# Patient Record
Sex: FEMALE | Race: BLACK OR AFRICAN AMERICAN | NOT HISPANIC OR LATINO | Employment: UNEMPLOYED | URBAN - METROPOLITAN AREA
[De-identification: names, ages, dates, MRNs, and addresses within clinical notes are randomized per-mention and may not be internally consistent; named-entity substitution may affect disease eponyms.]

---

## 2023-12-21 ENCOUNTER — HOSPITAL ENCOUNTER (EMERGENCY)
Facility: HOSPITAL | Age: 28
Discharge: HOME | End: 2023-12-21
Attending: EMERGENCY MEDICINE
Payer: MEDICAID

## 2023-12-21 ENCOUNTER — APPOINTMENT (OUTPATIENT)
Dept: RADIOLOGY | Facility: HOSPITAL | Age: 28
End: 2023-12-21
Payer: MEDICAID

## 2023-12-21 ENCOUNTER — ANCILLARY PROCEDURE (OUTPATIENT)
Dept: EMERGENCY MEDICINE | Facility: HOSPITAL | Age: 28
End: 2023-12-21
Payer: MEDICAID

## 2023-12-21 VITALS
HEART RATE: 50 BPM | DIASTOLIC BLOOD PRESSURE: 72 MMHG | SYSTOLIC BLOOD PRESSURE: 118 MMHG | WEIGHT: 215 LBS | BODY MASS INDEX: 35.82 KG/M2 | OXYGEN SATURATION: 100 % | RESPIRATION RATE: 16 BRPM | HEIGHT: 65 IN | TEMPERATURE: 98.1 F

## 2023-12-21 DIAGNOSIS — R42 LIGHTHEADEDNESS: Primary | ICD-10-CM

## 2023-12-21 DIAGNOSIS — R00.1 BRADYCARDIA: ICD-10-CM

## 2023-12-21 LAB
ALBUMIN SERPL BCP-MCNC: 3.2 G/DL (ref 3.4–5)
ALP SERPL-CCNC: 37 U/L (ref 33–110)
ALT SERPL W P-5'-P-CCNC: 8 U/L (ref 7–45)
ANION GAP SERPL CALC-SCNC: 10 MMOL/L (ref 10–20)
AST SERPL W P-5'-P-CCNC: 12 U/L (ref 9–39)
ATRIAL RATE: 48 BPM
B-HCG SERPL-ACNC: <3 MIU/ML
BASOPHILS # BLD AUTO: 0.06 X10*3/UL (ref 0–0.1)
BASOPHILS NFR BLD AUTO: 0.7 %
BILIRUB SERPL-MCNC: 0.2 MG/DL (ref 0–1.2)
BUN SERPL-MCNC: 13 MG/DL (ref 6–23)
CALCIUM SERPL-MCNC: 8.8 MG/DL (ref 8.6–10.6)
CHLORIDE SERPL-SCNC: 110 MMOL/L (ref 98–107)
CO2 SERPL-SCNC: 27 MMOL/L (ref 21–32)
CREAT SERPL-MCNC: 0.7 MG/DL (ref 0.5–1.05)
EOSINOPHIL # BLD AUTO: 0.22 X10*3/UL (ref 0–0.7)
EOSINOPHIL NFR BLD AUTO: 2.5 %
ERYTHROCYTE [DISTWIDTH] IN BLOOD BY AUTOMATED COUNT: 13.6 % (ref 11.5–14.5)
GFR SERPL CREATININE-BSD FRML MDRD: >90 ML/MIN/1.73M*2
GLUCOSE BLD MANUAL STRIP-MCNC: 90 MG/DL (ref 74–99)
GLUCOSE SERPL-MCNC: 87 MG/DL (ref 74–99)
HCT VFR BLD AUTO: 41.3 % (ref 36–46)
HGB BLD-MCNC: 13.9 G/DL (ref 12–16)
IMM GRANULOCYTES # BLD AUTO: 0.02 X10*3/UL (ref 0–0.7)
IMM GRANULOCYTES NFR BLD AUTO: 0.2 % (ref 0–0.9)
LYMPHOCYTES # BLD AUTO: 2.91 X10*3/UL (ref 1.2–4.8)
LYMPHOCYTES NFR BLD AUTO: 32.6 %
MAGNESIUM SERPL-MCNC: 1.81 MG/DL (ref 1.6–2.4)
MCH RBC QN AUTO: 30.6 PG (ref 26–34)
MCHC RBC AUTO-ENTMCNC: 33.7 G/DL (ref 32–36)
MCV RBC AUTO: 91 FL (ref 80–100)
MONOCYTES # BLD AUTO: 0.72 X10*3/UL (ref 0.1–1)
MONOCYTES NFR BLD AUTO: 8.1 %
NEUTROPHILS # BLD AUTO: 5 X10*3/UL (ref 1.2–7.7)
NEUTROPHILS NFR BLD AUTO: 55.9 %
NRBC BLD-RTO: 0 /100 WBCS (ref 0–0)
P AXIS: 21 DEGREES
P OFFSET: 185 MS
P ONSET: 134 MS
PHOSPHATE SERPL-MCNC: 4 MG/DL (ref 2.5–4.9)
PLATELET # BLD AUTO: 224 X10*3/UL (ref 150–450)
POTASSIUM SERPL-SCNC: 3.8 MMOL/L (ref 3.5–5.3)
PR INTERVAL: 174 MS
PROT SERPL-MCNC: 5.3 G/DL (ref 6.4–8.2)
Q ONSET: 221 MS
QRS COUNT: 8 BEATS
QRS DURATION: 88 MS
QT INTERVAL: 426 MS
QTC CALCULATION(BAZETT): 380 MS
QTC FREDERICIA: 395 MS
R AXIS: 26 DEGREES
RBC # BLD AUTO: 4.54 X10*6/UL (ref 4–5.2)
SODIUM SERPL-SCNC: 143 MMOL/L (ref 136–145)
T AXIS: 10 DEGREES
T OFFSET: 434 MS
TSH SERPL-ACNC: 1.08 MIU/L (ref 0.44–3.98)
VENTRICULAR RATE: 48 BPM
WBC # BLD AUTO: 8.9 X10*3/UL (ref 4.4–11.3)

## 2023-12-21 PROCEDURE — 2500000004 HC RX 250 GENERAL PHARMACY W/ HCPCS (ALT 636 FOR OP/ED): Mod: SE

## 2023-12-21 PROCEDURE — 70450 CT HEAD/BRAIN W/O DYE: CPT

## 2023-12-21 PROCEDURE — 96361 HYDRATE IV INFUSION ADD-ON: CPT

## 2023-12-21 PROCEDURE — 99285 EMERGENCY DEPT VISIT HI MDM: CPT | Mod: 25

## 2023-12-21 PROCEDURE — 80053 COMPREHEN METABOLIC PANEL: CPT

## 2023-12-21 PROCEDURE — 96374 THER/PROPH/DIAG INJ IV PUSH: CPT

## 2023-12-21 PROCEDURE — 82947 ASSAY GLUCOSE BLOOD QUANT: CPT

## 2023-12-21 PROCEDURE — 99284 EMERGENCY DEPT VISIT MOD MDM: CPT | Mod: 25 | Performed by: EMERGENCY MEDICINE

## 2023-12-21 PROCEDURE — 99285 EMERGENCY DEPT VISIT HI MDM: CPT | Performed by: EMERGENCY MEDICINE

## 2023-12-21 PROCEDURE — 84100 ASSAY OF PHOSPHORUS: CPT

## 2023-12-21 PROCEDURE — 84443 ASSAY THYROID STIM HORMONE: CPT

## 2023-12-21 PROCEDURE — 36415 COLL VENOUS BLD VENIPUNCTURE: CPT

## 2023-12-21 PROCEDURE — 83735 ASSAY OF MAGNESIUM: CPT

## 2023-12-21 PROCEDURE — 70450 CT HEAD/BRAIN W/O DYE: CPT | Performed by: RADIOLOGY

## 2023-12-21 PROCEDURE — 93005 ELECTROCARDIOGRAM TRACING: CPT

## 2023-12-21 PROCEDURE — 84702 CHORIONIC GONADOTROPIN TEST: CPT

## 2023-12-21 PROCEDURE — 85025 COMPLETE CBC W/AUTO DIFF WBC: CPT

## 2023-12-21 RX ORDER — KETOROLAC TROMETHAMINE 15 MG/ML
15 INJECTION, SOLUTION INTRAMUSCULAR; INTRAVENOUS ONCE
Status: COMPLETED | OUTPATIENT
Start: 2023-12-21 | End: 2023-12-21

## 2023-12-21 RX ORDER — KETOROLAC TROMETHAMINE 15 MG/ML
INJECTION, SOLUTION INTRAMUSCULAR; INTRAVENOUS
Status: COMPLETED
Start: 2023-12-21 | End: 2023-12-21

## 2023-12-21 RX ORDER — KETOROLAC TROMETHAMINE 30 MG/ML
INJECTION, SOLUTION INTRAMUSCULAR; INTRAVENOUS
Status: DISCONTINUED
Start: 2023-12-21 | End: 2023-12-21 | Stop reason: HOSPADM

## 2023-12-21 RX ADMIN — KETOROLAC TROMETHAMINE 15 MG: 15 INJECTION INTRAMUSCULAR; INTRAVENOUS at 10:07

## 2023-12-21 RX ADMIN — SODIUM CHLORIDE, POTASSIUM CHLORIDE, SODIUM LACTATE AND CALCIUM CHLORIDE 1000 ML: 600; 310; 30; 20 INJECTION, SOLUTION INTRAVENOUS at 08:40

## 2023-12-21 RX ADMIN — KETOROLAC TROMETHAMINE 15 MG: 15 INJECTION, SOLUTION INTRAMUSCULAR; INTRAVENOUS at 10:07

## 2023-12-21 ASSESSMENT — COLUMBIA-SUICIDE SEVERITY RATING SCALE - C-SSRS
1. IN THE PAST MONTH, HAVE YOU WISHED YOU WERE DEAD OR WISHED YOU COULD GO TO SLEEP AND NOT WAKE UP?: NO
6. HAVE YOU EVER DONE ANYTHING, STARTED TO DO ANYTHING, OR PREPARED TO DO ANYTHING TO END YOUR LIFE?: NO
2. HAVE YOU ACTUALLY HAD ANY THOUGHTS OF KILLING YOURSELF?: NO

## 2023-12-21 ASSESSMENT — PAIN SCALES - GENERAL: PAINLEVEL_OUTOF10: 7

## 2023-12-21 NOTE — DISCHARGE INSTRUCTIONS
.-- Please follow up with cardiology for your lightheadedness and low heart rate. Return to ED if symptoms are worse. Please see your PCP  Cardiology Clinic  [facility] [place]  Phone: [phone]

## 2023-12-21 NOTE — ED PROVIDER NOTES
CC: Dizziness (Dizzy/Near Syncopal in this AM. Pt state that she was dizzy x 2 weeks)     History provided by: Patient  Limitations to History: None    HPI:  Patient is a 28-year-old female with no pertinent medical history who presents with lightheadedness and headache ongoing for the past few months.  She presents today because when she woke up and got ready for work she began to feel a left-sided temporal headache and lightheadedness but denies passing out, prompting her visit to the ED. Her headache was gradual in onset, not the worst headache of her life. No trauma, falls, or injuries. No passing out. She reports similar symptoms in the past. She states she has had the symptoms ever since she got COVID a few months ago.  She denies any vision changes, neck pain, neck stiffness, chest pain, fevers, chills, shortness of breath, nausea, vomiting, abdominal pain, diarrhea, urinary symptoms, weakness, numbness, or tingling. No leg pain or leg swelling. No sick contacts or recent travels. She did not take anything for pain prior to arrival.  She denies any history of arrhythmias or sudden cardiac death in family.  She states she may have diabetes but is not on treatment.    External Records Reviewed:  I reviewed prior ED visits, Care Everywhere, discharge summaries and outpatient records as appropriate.   ???????????????????????????????????????????????????????????????  Triage Vitals:  T 36.7 °C (98.1 °F)  HR 53  /77  RR 16  O2 97 % None (Room air)    Physical Exam  Vitals and nursing note reviewed.   Constitutional:       General: She is not in acute distress.     Appearance: Normal appearance.   HENT:      Head: Normocephalic and atraumatic.   Eyes:      Conjunctiva/sclera: Conjunctivae normal.   Cardiovascular:      Rate and Rhythm: Normal rate and regular rhythm.   Pulmonary:      Effort: Pulmonary effort is normal. No respiratory distress.   Abdominal:      General: Abdomen is flat.      Palpations:  Abdomen is soft.      Tenderness: There is no abdominal tenderness.   Musculoskeletal:         General: Normal range of motion.      Cervical back: Normal range of motion and neck supple.      Comments: No midline C,T,L spine TTP   Skin:     General: Skin is warm and dry.   Neurological:      General: No focal deficit present.      Mental Status: She is alert and oriented to person, place, and time. Mental status is at baseline.      GCS: GCS eye subscore is 4. GCS verbal subscore is 5. GCS motor subscore is 6.      Cranial Nerves: Cranial nerves 2-12 are intact.      Sensory: Sensation is intact.      Motor: Motor function is intact.      Coordination: Coordination is intact. Finger-Nose-Finger Test normal.   Psychiatric:         Mood and Affect: Mood normal.         Behavior: Behavior normal.        ???????????????????????????????????????????????????????????????  ED Course/Treatment/Medical Decision Making  MDM:  Patient is a 28-year-old female presents with lightheadedness.  Vital signs are stable. The patient is afebrile and bradycardic. Per review  of prior records, prior EKGs even in 2022 showed bradycardia with HR in the 50s so this is not new for the patient. The patient is in no respiratory distress, satting well on room air. The patient has no focal neurological deficits on examination.  Differential diagnosis is broad and includes but not limited to anemia, dehydration, malnutrition, electrolyte abnormality, endocrine disease, chronic lung disease, chronic heart disease, ICH, mass, CVA, other cerebral/spinal cord disease including GBS or myopathy, infection.  In the absence of focal neurological deficits and chronicity of headache I have low suspicion for acute intracranial pathology however will obtain CT head for further evaluation. Labwork was ordered for further evaluation. CBC showed no leukocytosis. Hemoglobin is normal. Pregnancy test is negative. CMP, magnesium, TSH, and phosphorus are normal.  Patient was given 1 L IV fluids and IV toradol to treat her symptoms. Upon repeat evaluation, the patient reports improvement of her symptoms. She remains stable, neurologically intact, ambulatory with steady gait. The patient was informed of the results. The patient felt comfortable being discharged home. The patient was instructed of supportive measures and to follow-up with a primary care physician. Return precautions were provided, for which the patient expressed understanding. The patient was discharged home in stable condition. They should feel free to return to the Emergency Department at any time should their condition worsen or should they have any questions or concerns.     ED Course:  ED Course as of 12/21/23 1204   Thu Dec 21, 2023   0825 EKG sinus bradycardia with sinus arrhythmia rate 48, NE interval 174 ms, QRS 88 ms, QTc 380 ms, no acute ST segment elevations or depressions, there are some motion artifact.  No significant prolonged QT, signs of WPW, Brugada syndrome. [SA]   0929 CBC, TSH, CMP wnl [SA]   0935 Ambulating in ED with steady gait [SA]   1204 IMPRESSION:  No evidence of acute cortical infarct, mass effect, or intracranial  hemorrhage.   [SA]      ED Course User Index  [SA] Yara Nixon,          Diagnoses as of 12/21/23 1204   Lightheadedness   Bradycardia       EKG Interpretation:  See ED Course/Below:    Independent Interpretation of Studies:  I independently interpreted labs/imaging as stated in ED Course or below.    Differential diagnoses considered include but are not limited to: See MDM/Below:    Social Determinants Limiting Care:  Poor health literacy      Disposition:  Discharged with cardiology follow up    Discussed differential and workup results including pertinent labs/imaging and any incidental findings if applicable. Patient will follow-up with the primary physician in the next 2-3 days. Return if worse. They understand return precautions and discharge instructions.  Patient and family/friend/caregiver are in agreement with this plan.       SHONNA Lozada, PGY-2    I reviewed the case with the attending ED physician. The attending ED physician agrees with the plan. Patient and/or patient´s representative was counseled regarding labs, imaging, likely diagnosis, and plan. All questions were answered.    Disclaimer: This note was dictated by speech recognition.  Attempt at proofreading was made to minimize errors.  Errors in transcription may be present.  Please call if questions.    Procedures ? Smart[employer] last updated 12/21/2023 12:04 PM        Yara Nixon DO  Resident  12/21/23 1200       Addis Crouch MD  12/21/23 1231

## 2023-12-25 ENCOUNTER — APPOINTMENT (OUTPATIENT)
Dept: RADIOLOGY | Facility: HOSPITAL | Age: 28
End: 2023-12-25
Payer: MEDICAID

## 2023-12-25 ENCOUNTER — HOSPITAL ENCOUNTER (EMERGENCY)
Facility: HOSPITAL | Age: 28
Discharge: HOME | End: 2023-12-25
Attending: EMERGENCY MEDICINE
Payer: MEDICAID

## 2023-12-25 VITALS
DIASTOLIC BLOOD PRESSURE: 67 MMHG | WEIGHT: 215 LBS | TEMPERATURE: 98.1 F | HEART RATE: 51 BPM | HEIGHT: 65 IN | SYSTOLIC BLOOD PRESSURE: 127 MMHG | BODY MASS INDEX: 35.82 KG/M2 | OXYGEN SATURATION: 99 % | RESPIRATION RATE: 20 BRPM

## 2023-12-25 DIAGNOSIS — R06.02 SHORTNESS OF BREATH: Primary | ICD-10-CM

## 2023-12-25 LAB
ANION GAP SERPL CALC-SCNC: 11 MMOL/L (ref 10–20)
ATRIAL RATE: 62 BPM
BASOPHILS # BLD AUTO: 0.06 X10*3/UL (ref 0–0.1)
BASOPHILS NFR BLD AUTO: 0.5 %
BUN SERPL-MCNC: 10 MG/DL (ref 6–23)
CALCIUM SERPL-MCNC: 9.4 MG/DL (ref 8.6–10.6)
CHLORIDE SERPL-SCNC: 110 MMOL/L (ref 98–107)
CO2 SERPL-SCNC: 22 MMOL/L (ref 21–32)
CREAT SERPL-MCNC: 0.8 MG/DL (ref 0.5–1.05)
EOSINOPHIL # BLD AUTO: 0.27 X10*3/UL (ref 0–0.7)
EOSINOPHIL NFR BLD AUTO: 2.2 %
ERYTHROCYTE [DISTWIDTH] IN BLOOD BY AUTOMATED COUNT: 13.1 % (ref 11.5–14.5)
FLUAV RNA RESP QL NAA+PROBE: NOT DETECTED
FLUBV RNA RESP QL NAA+PROBE: NOT DETECTED
GFR SERPL CREATININE-BSD FRML MDRD: >90 ML/MIN/1.73M*2
GLUCOSE SERPL-MCNC: 113 MG/DL (ref 74–99)
HCT VFR BLD AUTO: 37.6 % (ref 36–46)
HGB BLD-MCNC: 13 G/DL (ref 12–16)
HOLD SPECIMEN: NORMAL
IMM GRANULOCYTES # BLD AUTO: 0.04 X10*3/UL (ref 0–0.7)
IMM GRANULOCYTES NFR BLD AUTO: 0.3 % (ref 0–0.9)
LYMPHOCYTES # BLD AUTO: 4.14 X10*3/UL (ref 1.2–4.8)
LYMPHOCYTES NFR BLD AUTO: 33.8 %
MCH RBC QN AUTO: 30.9 PG (ref 26–34)
MCHC RBC AUTO-ENTMCNC: 34.6 G/DL (ref 32–36)
MCV RBC AUTO: 89 FL (ref 80–100)
MONOCYTES # BLD AUTO: 1.06 X10*3/UL (ref 0.1–1)
MONOCYTES NFR BLD AUTO: 8.6 %
NEUTROPHILS # BLD AUTO: 6.69 X10*3/UL (ref 1.2–7.7)
NEUTROPHILS NFR BLD AUTO: 54.6 %
NRBC BLD-RTO: 0 /100 WBCS (ref 0–0)
P AXIS: 19 DEGREES
P OFFSET: 182 MS
P ONSET: 125 MS
PLATELET # BLD AUTO: 249 X10*3/UL (ref 150–450)
POTASSIUM SERPL-SCNC: 3.4 MMOL/L (ref 3.5–5.3)
PR INTERVAL: 188 MS
Q ONSET: 219 MS
QRS COUNT: 9 BEATS
QRS DURATION: 90 MS
QT INTERVAL: 416 MS
QTC CALCULATION(BAZETT): 422 MS
QTC FREDERICIA: 420 MS
R AXIS: 51 DEGREES
RBC # BLD AUTO: 4.21 X10*6/UL (ref 4–5.2)
SARS-COV-2 RNA RESP QL NAA+PROBE: NOT DETECTED
SODIUM SERPL-SCNC: 140 MMOL/L (ref 136–145)
T AXIS: 44 DEGREES
T OFFSET: 427 MS
VENTRICULAR RATE: 62 BPM
WBC # BLD AUTO: 12.3 X10*3/UL (ref 4.4–11.3)

## 2023-12-25 PROCEDURE — 36415 COLL VENOUS BLD VENIPUNCTURE: CPT

## 2023-12-25 PROCEDURE — 80048 BASIC METABOLIC PNL TOTAL CA: CPT

## 2023-12-25 PROCEDURE — 99285 EMERGENCY DEPT VISIT HI MDM: CPT | Performed by: EMERGENCY MEDICINE

## 2023-12-25 PROCEDURE — 71046 X-RAY EXAM CHEST 2 VIEWS: CPT

## 2023-12-25 PROCEDURE — 71046 X-RAY EXAM CHEST 2 VIEWS: CPT | Performed by: RADIOLOGY

## 2023-12-25 PROCEDURE — 2500000004 HC RX 250 GENERAL PHARMACY W/ HCPCS (ALT 636 FOR OP/ED): Mod: SE

## 2023-12-25 PROCEDURE — 84132 ASSAY OF SERUM POTASSIUM: CPT

## 2023-12-25 PROCEDURE — 85025 COMPLETE CBC W/AUTO DIFF WBC: CPT

## 2023-12-25 PROCEDURE — 87636 SARSCOV2 & INF A&B AMP PRB: CPT

## 2023-12-25 PROCEDURE — 99283 EMERGENCY DEPT VISIT LOW MDM: CPT | Mod: 25

## 2023-12-25 PROCEDURE — 99284 EMERGENCY DEPT VISIT MOD MDM: CPT | Performed by: EMERGENCY MEDICINE

## 2023-12-25 RX ORDER — POTASSIUM CHLORIDE 20 MEQ/1
40 TABLET, EXTENDED RELEASE ORAL ONCE
Status: COMPLETED | OUTPATIENT
Start: 2023-12-25 | End: 2023-12-25

## 2023-12-25 RX ADMIN — POTASSIUM CHLORIDE 40 MEQ: 1500 TABLET, EXTENDED RELEASE ORAL at 18:55

## 2023-12-25 ASSESSMENT — PAIN - FUNCTIONAL ASSESSMENT: PAIN_FUNCTIONAL_ASSESSMENT: 0-10

## 2023-12-25 ASSESSMENT — PAIN SCALES - GENERAL: PAINLEVEL_OUTOF10: 0 - NO PAIN

## 2023-12-25 NOTE — ED TRIAGE NOTES
Pt BIBA with c/o SOB, she was here a few days ago and supposed to fu with cardiology; unable to schedule due to holiday

## 2023-12-25 NOTE — ED PROVIDER NOTES
HPI   No chief complaint on file.      A 28-year-old female who presents to the emergency department for shortness of breath.  Patient is presenting with shortness of breath has no pertinent past medical history was recently seen on the 21st of this month and discharged for similar presentation.  After extensive workup there was no defined pathology identified.  Patient states that today her shortness of breath has a pleuritic component to it.  She denies chest pain she denies syncope she denies recent sick symptoms.  She denies leg swelling or claudication.  She denies past history of blood clots.  She denies history of OCP use or malignancy.  She is requesting COVID and flu swabs however.  She denies symptomatology for COVID other than the fact that she said she had COVID last year and with that she got short of breath as well.                          No data recorded                Patient History   No past medical history on file.  No past surgical history on file.  No family history on file.  Social History     Tobacco Use    Smoking status: Not on file    Smokeless tobacco: Not on file   Substance Use Topics    Alcohol use: Not on file    Drug use: Not on file       Physical Exam   ED Triage Vitals   Temp Pulse Resp BP   -- -- -- --      SpO2 Temp src Heart Rate Source Patient Position   -- -- -- --      BP Location FiO2 (%)     -- --       Physical Exam  Constitutional:       Appearance: She is not ill-appearing.   HENT:      Head: Normocephalic and atraumatic.      Right Ear: External ear normal.      Left Ear: External ear normal.      Nose: Nose normal.      Mouth/Throat:      Mouth: Mucous membranes are moist.      Pharynx: Oropharynx is clear.   Eyes:      General: No scleral icterus.     Extraocular Movements: Extraocular movements intact.      Pupils: Pupils are equal, round, and reactive to light.   Cardiovascular:      Rate and Rhythm: Normal rate and regular rhythm.      Pulses: Normal pulses.       Heart sounds: Normal heart sounds.   Pulmonary:      Effort: Pulmonary effort is normal. No respiratory distress.      Breath sounds: Normal breath sounds.   Abdominal:      General: Abdomen is flat.      Palpations: Abdomen is soft.      Tenderness: There is no abdominal tenderness.   Musculoskeletal:      Cervical back: Neck supple.      Right lower leg: No edema.      Left lower leg: No edema.   Skin:     General: Skin is warm and dry.      Capillary Refill: Capillary refill takes less than 2 seconds.   Neurological:      General: No focal deficit present.      Mental Status: She is alert and oriented to person, place, and time.      Sensory: No sensory deficit.      Motor: No weakness.      Gait: Gait normal.   Psychiatric:         Mood and Affect: Mood normal.         Behavior: Behavior normal.         ED Course & MDM   Diagnoses as of 12/25/23 1832   Shortness of breath       Medical Decision Making  28-year-old well-appearing female presents emergency department for shortness of breath she is in no respiratory distress her vital signs are unremarkable she is saturating well on room air and she is not tachypneic.  She states that she does have a pleuritic component to her chest pain today she can be perked out and pretest probability for pulmonary embolism in this patient is low.  He is requesting COVID and flu swabs which we will obtain.  Will also obtain EKG and chest x-ray.  CBC and BMP additionally as well.    The patient states that she is at her baseline now and has no symptoms.  She states that a large factor in her presentation today was the fact that she could not contact her PCP to move up her appointment from January 10.  I discussed with the patient that the physicians offices most likely close given the holiday and that when she eventually is discharged with her today or after admission that she should call this week not during the holiday to discuss moving up her appointment in the setting of  recently being seen twice in the emergency department.  She expressed her understanding of this and expressed appreciation for the clarification.    Patient had a white count of 12.3, chest x-ray unremarkable.    Patient will be signed out to the oncoming provider team pending the completion of her workup.        Procedure  Procedures     Timothy Sahu MD  Resident  12/25/23 1202

## 2023-12-26 ENCOUNTER — ANCILLARY PROCEDURE (OUTPATIENT)
Dept: EMERGENCY MEDICINE | Facility: HOSPITAL | Age: 28
End: 2023-12-26
Payer: MEDICAID

## 2023-12-26 LAB
ANION GAP BLDV CALCULATED.4IONS-SCNC: 9 MMOL/L (ref 10–25)
BASE EXCESS BLDV CALC-SCNC: 1 MMOL/L (ref -2–3)
BODY TEMPERATURE: 37 DEGREES CELSIUS
CA-I BLDV-SCNC: 1.24 MMOL/L (ref 1.1–1.33)
CHLORIDE BLDV-SCNC: 109 MMOL/L (ref 98–107)
GLUCOSE BLDV-MCNC: 109 MG/DL (ref 74–99)
HCO3 BLDV-SCNC: 23.1 MMOL/L (ref 22–26)
HCT VFR BLD EST: 40 % (ref 36–46)
HGB BLDV-MCNC: 13.3 G/DL (ref 12–16)
LACTATE BLDV-SCNC: 1 MMOL/L (ref 0.4–2)
OXYHGB MFR BLDV: 81.5 % (ref 45–75)
PCO2 BLDV: 29 MM HG (ref 41–51)
PH BLDV: 7.51 PH (ref 7.33–7.43)
PO2 BLDV: 51 MM HG (ref 35–45)
POTASSIUM BLDV-SCNC: 3.4 MMOL/L (ref 3.5–5.3)
SAO2 % BLDV: 85 % (ref 45–75)
SODIUM BLDV-SCNC: 138 MMOL/L (ref 136–145)

## 2023-12-26 PROCEDURE — 93005 ELECTROCARDIOGRAM TRACING: CPT

## 2023-12-26 NOTE — PROGRESS NOTES
Emergency Medicine Transition of Care Note.    I received Francis Bray in signout from previous provider. Please see the previous ED provider note for all HPI, PE and MDM up to the time of sign-out. This is in addition to the primary record.    Diagnoses as of 12/25/23 1930   Shortness of breath     Medical Decision Making    Final diagnoses:   [R06.02] Shortness of breath     At the time of sign out, vital signs were as follows:  Vitals:    12/25/23 1900   BP: 119/79   Pulse: 51   Resp: 20   Temp:    SpO2: 99%     In brief Francis Bray is an 28 y.o. female presenting for shortness of breath.    Patient was signed out to me pending COVID and flu test.  Patient was COVID and flu negative.  Patient was discharged.      Dispo: Discharge to home    Francine Sharma MD  Emergency Medicine, PGY-1

## 2025-05-17 ENCOUNTER — CLINICAL SUPPORT (OUTPATIENT)
Dept: EMERGENCY MEDICINE | Facility: HOSPITAL | Age: 30
End: 2025-05-17
Payer: MEDICAID

## 2025-05-17 ENCOUNTER — HOSPITAL ENCOUNTER (EMERGENCY)
Facility: HOSPITAL | Age: 30
Discharge: HOME | End: 2025-05-17
Payer: MEDICAID

## 2025-05-17 VITALS
DIASTOLIC BLOOD PRESSURE: 80 MMHG | SYSTOLIC BLOOD PRESSURE: 120 MMHG | TEMPERATURE: 97.5 F | BODY MASS INDEX: 35.82 KG/M2 | WEIGHT: 215 LBS | OXYGEN SATURATION: 99 % | HEIGHT: 65 IN | RESPIRATION RATE: 16 BRPM | HEART RATE: 58 BPM

## 2025-05-17 DIAGNOSIS — G43.909 ACUTE MIGRAINE: Primary | ICD-10-CM

## 2025-05-17 LAB
ATRIAL RATE: 63 BPM
P AXIS: 15 DEGREES
P OFFSET: 191 MS
P ONSET: 143 MS
PR INTERVAL: 154 MS
PREGNANCY TEST URINE, POC: NEGATIVE
Q ONSET: 220 MS
QRS COUNT: 10 BEATS
QRS DURATION: 88 MS
QT INTERVAL: 412 MS
QTC CALCULATION(BAZETT): 421 MS
QTC FREDERICIA: 418 MS
R AXIS: 55 DEGREES
T AXIS: 31 DEGREES
T OFFSET: 426 MS
VENTRICULAR RATE: 63 BPM

## 2025-05-17 PROCEDURE — 96374 THER/PROPH/DIAG INJ IV PUSH: CPT

## 2025-05-17 PROCEDURE — 93010 ELECTROCARDIOGRAM REPORT: CPT | Performed by: NURSE PRACTITIONER

## 2025-05-17 PROCEDURE — 99284 EMERGENCY DEPT VISIT MOD MDM: CPT

## 2025-05-17 PROCEDURE — 99284 EMERGENCY DEPT VISIT MOD MDM: CPT | Mod: 25

## 2025-05-17 PROCEDURE — 96375 TX/PRO/DX INJ NEW DRUG ADDON: CPT

## 2025-05-17 PROCEDURE — 96361 HYDRATE IV INFUSION ADD-ON: CPT

## 2025-05-17 PROCEDURE — 2500000001 HC RX 250 WO HCPCS SELF ADMINISTERED DRUGS (ALT 637 FOR MEDICARE OP): Mod: SE

## 2025-05-17 PROCEDURE — 81025 URINE PREGNANCY TEST: CPT

## 2025-05-17 PROCEDURE — 93005 ELECTROCARDIOGRAM TRACING: CPT

## 2025-05-17 PROCEDURE — 2500000004 HC RX 250 GENERAL PHARMACY W/ HCPCS (ALT 636 FOR OP/ED): Mod: SE,JZ

## 2025-05-17 RX ORDER — ACETAMINOPHEN 325 MG/1
975 TABLET ORAL ONCE
Status: COMPLETED | OUTPATIENT
Start: 2025-05-17 | End: 2025-05-17

## 2025-05-17 RX ORDER — ACETAMINOPHEN 500 MG
1000 TABLET ORAL EVERY 6 HOURS PRN
Qty: 30 TABLET | Refills: 0 | Status: SHIPPED | OUTPATIENT
Start: 2025-05-17 | End: 2025-05-20 | Stop reason: WASHOUT

## 2025-05-17 RX ORDER — METOCLOPRAMIDE HYDROCHLORIDE 5 MG/ML
10 INJECTION INTRAMUSCULAR; INTRAVENOUS ONCE
Status: COMPLETED | OUTPATIENT
Start: 2025-05-17 | End: 2025-05-17

## 2025-05-17 RX ORDER — KETOROLAC TROMETHAMINE 15 MG/ML
15 INJECTION, SOLUTION INTRAMUSCULAR; INTRAVENOUS ONCE
Status: COMPLETED | OUTPATIENT
Start: 2025-05-17 | End: 2025-05-17

## 2025-05-17 RX ORDER — DIPHENHYDRAMINE HCL 25 MG
25 TABLET ORAL EVERY 6 HOURS
Qty: 20 TABLET | Refills: 0 | Status: SHIPPED | OUTPATIENT
Start: 2025-05-17 | End: 2025-05-22

## 2025-05-17 RX ADMIN — METOCLOPRAMIDE 10 MG: 5 INJECTION, SOLUTION INTRAMUSCULAR; INTRAVENOUS at 18:52

## 2025-05-17 RX ADMIN — SODIUM CHLORIDE 1000 ML: 0.9 INJECTION, SOLUTION INTRAVENOUS at 18:52

## 2025-05-17 RX ADMIN — KETOROLAC TROMETHAMINE 15 MG: 15 INJECTION, SOLUTION INTRAMUSCULAR; INTRAVENOUS at 18:54

## 2025-05-17 RX ADMIN — ACETAMINOPHEN 975 MG: 325 TABLET ORAL at 18:52

## 2025-05-17 ASSESSMENT — PAIN - FUNCTIONAL ASSESSMENT
PAIN_FUNCTIONAL_ASSESSMENT: 0-10
PAIN_FUNCTIONAL_ASSESSMENT: 0-10

## 2025-05-17 ASSESSMENT — PAIN DESCRIPTION - LOCATION: LOCATION: HEAD

## 2025-05-17 ASSESSMENT — PAIN SCALES - GENERAL
PAINLEVEL_OUTOF10: 10 - WORST POSSIBLE PAIN
PAINLEVEL_OUTOF10: 3

## 2025-05-17 ASSESSMENT — PAIN DESCRIPTION - PAIN TYPE: TYPE: ACUTE PAIN

## 2025-05-17 NOTE — ED PROVIDER NOTES
History of Present Illness     History provided by: Patient  Limitations to History: None  External Records Reviewed with Brief Summary: None    HPI:  Francis Bray is a 29 y.o. female who presents to the emergency department for concern of intermittent gradual left sided headache since 630 this morning.  She reports taking ibuprofen this morning, took a nap and headache returned upon waking up.  She reports does have migraines and usually takes ibuprofen and it resolves, however, states this is more intense.  She reports blurry vision and dizziness.  She denies fever, chills, body aches, traumatic injury, recent fall, decreased vision, chest pain or shortness of breath.    Physical Exam   Triage vitals:  T 36.4 °C (97.6 °F)  HR 55  /76  RR 16  O2 100 % None (Room air)    Physical Exam  Vitals and nursing note reviewed.   Constitutional:       General: She is not in acute distress.     Appearance: She is not toxic-appearing.   HENT:      Head: Normocephalic and atraumatic.   Eyes:      Extraocular Movements: Extraocular movements intact.      Pupils: Pupils are equal, round, and reactive to light.   Cardiovascular:      Rate and Rhythm: Normal rate and regular rhythm.      Heart sounds: Normal heart sounds.   Pulmonary:      Effort: Pulmonary effort is normal.      Breath sounds: Normal breath sounds.   Abdominal:      General: Bowel sounds are normal.      Palpations: Abdomen is soft.   Musculoskeletal:         General: Normal range of motion.      Cervical back: Normal range of motion and neck supple.   Skin:     General: Skin is warm and dry.   Neurological:      Mental Status: She is alert and oriented to person, place, and time.      GCS: GCS eye subscore is 4. GCS verbal subscore is 5. GCS motor subscore is 6.      Cranial Nerves: No cranial nerve deficit.      Motor: No weakness.      Gait: Gait normal.   Psychiatric:         Mood and Affect: Mood normal.         Behavior: Behavior normal.           Medical Decision Making & ED Course   Medical Decision Makin y.o. female presents to the emergency department with nontraumatic headache.  Upon examination, patient is alert and oriented to place and person time.  No neurological deficits lowering concern for ICH and CVA. EOMs intact.  PERRL. No nystagmus.  Speech clear and fluent.  Sensation intact.  Regular heart rate and rhythm.  Lung sounds clear to auscultation.  Gait steady without difficulty.    Given no acute injury, low suspicion for ICH do not believe this patient needs a CTH.  Provided acetaminophen, Reglan IV, Toradol IV and LR.  Benadryl held patient states she does not have a .    Post treatment assessment, patient reports improvement to headache.  Given the headache is unilateral, occipital shaded with dizziness and blurry vision is consistent with a migraine.  Patient educated on symptoms that need emergent evaluation. Patient educated to follow-up with the [facility] as needed.      Differential diagnoses considered include but are not limited to: ICH, CVA migraine,     Social Determinants of Health which Significantly Impact Care: None identified     EKG Independent Interpretation: EKG interpreted by myself. Please see ED Course for full interpretation.    Independent Result Review and Interpretation: None obtained    Chronic conditions affecting the patient's care: As documented above in [facility]    The patient was discussed with the following consultants/services: None    Care Considerations: As documented above in [facility]    ED Course:  ED Course as of 25   Sat May 17, 2025   1904 After receiving IV, patient reports dizziness increase, states she passes out with shots or IVs.  EKG ordered. [ED]      ED Course User Index  [ED] HOWARD Cummings-CNP         Diagnoses as of 25   Acute migraine     Disposition   Discharge    Procedures   Procedures    Patient was seen independently    Eboni Bush  HOWARD-CNP  Emergency Medicine     Eboni Bush, STEPHANIE  05/17/25 2561

## 2025-05-17 NOTE — ED TRIAGE NOTES
Pt C/O headache that was present when Pt woke up at 0630. Pt reports chronic blurred vision, and dizziness that have increased with onset of headache.  Pt took ibuprofen at 0700 without relief. Pt denies N/V. Pt is A/O x4, VSS, Resp E&U. Denies PMH.

## 2025-05-17 NOTE — Clinical Note
Francis Bray was seen and treated in our emergency department on 5/17/2025.  She may return to work on 05/19/2025.       If you have any questions or concerns, please don't hesitate to call.      Eboni Bush, APRN-CNP

## 2025-05-18 NOTE — DISCHARGE INSTRUCTIONS
May take acetaminophen or Benadryl if headache returns.  Stop smoking  Follow-up at the [facility]  If symptoms worsen or new symptoms present including persistent headache, neurological changes, mental status changes, persistent vomiting return to the emergency department.

## 2025-05-20 ENCOUNTER — HOSPITAL ENCOUNTER (EMERGENCY)
Facility: HOSPITAL | Age: 30
Discharge: HOME | End: 2025-05-20
Payer: MEDICAID

## 2025-05-20 ENCOUNTER — APPOINTMENT (OUTPATIENT)
Dept: RADIOLOGY | Facility: HOSPITAL | Age: 30
End: 2025-05-20
Payer: MEDICAID

## 2025-05-20 VITALS
TEMPERATURE: 98.4 F | OXYGEN SATURATION: 99 % | DIASTOLIC BLOOD PRESSURE: 84 MMHG | BODY MASS INDEX: 35.82 KG/M2 | HEART RATE: 68 BPM | RESPIRATION RATE: 16 BRPM | SYSTOLIC BLOOD PRESSURE: 128 MMHG | WEIGHT: 215 LBS | HEIGHT: 65 IN

## 2025-05-20 DIAGNOSIS — G44.209 ACUTE NON INTRACTABLE TENSION-TYPE HEADACHE: Primary | ICD-10-CM

## 2025-05-20 DIAGNOSIS — E23.6 EMPTY SELLA (MULTI): ICD-10-CM

## 2025-05-20 LAB — PREGNANCY TEST URINE, POC: NEGATIVE

## 2025-05-20 PROCEDURE — 99284 EMERGENCY DEPT VISIT MOD MDM: CPT | Mod: 25

## 2025-05-20 PROCEDURE — 2500000001 HC RX 250 WO HCPCS SELF ADMINISTERED DRUGS (ALT 637 FOR MEDICARE OP): Mod: SE

## 2025-05-20 PROCEDURE — 70450 CT HEAD/BRAIN W/O DYE: CPT

## 2025-05-20 PROCEDURE — 70450 CT HEAD/BRAIN W/O DYE: CPT | Performed by: RADIOLOGY

## 2025-05-20 PROCEDURE — 81025 URINE PREGNANCY TEST: CPT

## 2025-05-20 RX ORDER — DIPHENHYDRAMINE HCL 25 MG
25 CAPSULE ORAL ONCE
Status: COMPLETED | OUTPATIENT
Start: 2025-05-20 | End: 2025-05-20

## 2025-05-20 RX ORDER — ACETAMINOPHEN 325 MG/1
975 TABLET ORAL ONCE
Status: COMPLETED | OUTPATIENT
Start: 2025-05-20 | End: 2025-05-20

## 2025-05-20 RX ORDER — ACETAMINOPHEN 500 MG
500 TABLET ORAL EVERY 6 HOURS PRN
Qty: 20 TABLET | Refills: 0 | Status: SHIPPED | OUTPATIENT
Start: 2025-05-20 | End: 2025-05-25

## 2025-05-20 RX ORDER — METOCLOPRAMIDE 10 MG/1
10 TABLET ORAL ONCE
Status: COMPLETED | OUTPATIENT
Start: 2025-05-20 | End: 2025-05-20

## 2025-05-20 RX ADMIN — DIPHENHYDRAMINE HYDROCHLORIDE 25 MG: 25 CAPSULE ORAL at 12:52

## 2025-05-20 RX ADMIN — ACETAMINOPHEN 975 MG: 325 TABLET ORAL at 12:52

## 2025-05-20 RX ADMIN — METOCLOPRAMIDE 10 MG: 10 TABLET ORAL at 12:52

## 2025-05-20 ASSESSMENT — PAIN SCALES - GENERAL: PAINLEVEL_OUTOF10: 10 - WORST POSSIBLE PAIN

## 2025-05-20 ASSESSMENT — PAIN - FUNCTIONAL ASSESSMENT: PAIN_FUNCTIONAL_ASSESSMENT: 0-10

## 2025-05-20 NOTE — ED TRIAGE NOTES
Pt presents to ED with c/o a HA that started several months ago. Pt states she thought it might have started from smoking though has since stopped. Pt describes intermittent dizziness accompanied by the headaches. Rates pain 10/10. Denies N/V. Denies significant medical hx.

## 2025-05-20 NOTE — DISCHARGE INSTRUCTIONS
Please follow-up with neurology for further evaluation management of your headaches.  Take Tylenol and ibuprofen at home for headache relief.  Prescription for Tylenol has been sent to your pharmacy.  Please take medication as prescribed.  Return to the emergency department if your symptoms persist worsen or any new symptoms began.

## 2025-05-20 NOTE — ED PROVIDER NOTES
HPI   Chief Complaint   Patient presents with    Headache       HPI    Francis Bray is a 29-year-old female with no significant medical history presented the ED with a headache for over 2 months.  Patient states the headache is intermittent and lasts about 20 minutes.  Patient states the headaches resolve on their own.  Patient states she has not taken pain medication at home.  Patient denies close and injuries to the head.  Patient states she does not take blood thinners.  Patient denies chest pain, shortness of breath, abdominal pain, nausea, vomiting, fevers, bodies, chills, dizziness, blurry vision.    Patient History   Medical History[1]  Surgical History[2]  Family History[3]  Social History[4]    Physical Exam   ED Triage Vitals [05/20/25 1132]   Temperature Heart Rate Respirations BP   36.9 °C (98.4 °F) 68 17 124/86      Pulse Ox Temp src Heart Rate Source Patient Position   100 % -- -- --      BP Location FiO2 (%)     -- --       Physical Exam  Vitals and nursing note reviewed.   Constitutional:       Appearance: Normal appearance.   HENT:      Head: Normocephalic and atraumatic.   Eyes:      Extraocular Movements: Extraocular movements intact.      Conjunctiva/sclera: Conjunctivae normal.      Pupils: Pupils are equal, round, and reactive to light.   Cardiovascular:      Rate and Rhythm: Normal rate and regular rhythm.      Heart sounds: Normal heart sounds. No murmur heard.     No gallop.   Pulmonary:      Effort: Pulmonary effort is normal. No respiratory distress.      Breath sounds: Normal breath sounds. No stridor. No wheezing, rhonchi or rales.   Abdominal:      General: Abdomen is flat. Bowel sounds are normal. There is no distension.      Palpations: Abdomen is soft. There is no mass.      Tenderness: There is no abdominal tenderness. There is no guarding or rebound.      Hernia: No hernia is present.   Musculoskeletal:      Right lower leg: No edema.      Left lower leg: No edema.   Skin:      General: Skin is warm and dry.   Neurological:      General: No focal deficit present.      Mental Status: She is alert and oriented to person, place, and time.      GCS: GCS eye subscore is 4. GCS verbal subscore is 5. GCS motor subscore is 6.      Cranial Nerves: Cranial nerves 2-12 are intact. No dysarthria or facial asymmetry.      Sensory: Sensation is intact.      Motor: Motor function is intact. No tremor or pronator drift.      Coordination: Coordination is intact. Coordination normal. Finger-Nose-Finger Test and Heel to Shin Test normal. Rapid alternating movements normal.      Gait: Gait is intact.   Psychiatric:         Mood and Affect: Mood normal.         Behavior: Behavior normal.           ED Course & MDM   Diagnoses as of 05/21/25 1714   Acute non intractable tension-type headache   Empty sella (Multi)                 No data recorded     [place] Coma Scale Score: 15 (05/20/25 1133 : Sheila Foss RN)                           Medical Decision Making    This is a 21-year-old female presenting the ED with headache for over 2 months.  Neuroexam is unremarkable today.  Given patient has been having this headache for 2 months, CT head obtained to rule out intracranial abnormality such as intracranial hemorrhage, mass, hematoma.  Patient was given migraine cocktail consisting of Tylenol, Benadryl, Reglan.  Pricey test is negative.  CT shows no acute findings or interval change from 2023 study.  CT does show partial empty sella configuration pituitary gland which can be associated with idiopathic intracranial hypertension.  On reevaluation patient states her headache has resolved.  Discussed results of CT with the patient.  Discussed with patient she will need to follow-up with neurology for further evaluation of the findings on CT. given patient's headache has been present for 2 months, neuroexam is unremarkable, and headache resolved the patient will follow-up with neurology outpatient.  Patient has  been hemodynamically stable in the emergency department today.    Disposition: Discharge home  Patient advised to follow-up with neurology for further evaluation and management of her headaches and findings on CT.  Referral and contact information to the neurology clinic has been included in discharge paperwork.  Patient advised to take Tylenol at home for pain relief.  Prescription for Tylenol has been sent to patient's pharmacy.  Patient advised to take the medication as prescribed.  Strict return precautions were given.  Plan was discussed with the patient the patient understands and agrees.  Patient was discharged in stable condition.  Procedure  Procedures         [1] No past medical history on file.  [2] No past surgical history on file.  [3] No family history on file.  [4]   Social History  Tobacco Use    Smoking status: Not on file    Smokeless tobacco: Not on file   Substance Use Topics    Alcohol use: Not on file    Drug use: Not on file        Maicol Thibodeaux PA-C  05/21/25 6785

## 2025-06-24 ENCOUNTER — OFFICE VISIT (OUTPATIENT)
Facility: HOSPITAL | Age: 30
End: 2025-06-24
Payer: MEDICAID

## 2025-06-24 VITALS
TEMPERATURE: 96 F | BODY MASS INDEX: 33.32 KG/M2 | HEART RATE: 80 BPM | WEIGHT: 200 LBS | HEIGHT: 65 IN | SYSTOLIC BLOOD PRESSURE: 116 MMHG | OXYGEN SATURATION: 98 % | DIASTOLIC BLOOD PRESSURE: 80 MMHG

## 2025-06-24 DIAGNOSIS — Z00.00 HEALTHCARE MAINTENANCE: Primary | ICD-10-CM

## 2025-06-24 DIAGNOSIS — E23.6 EMPTY SELLA SYNDROME (MULTI): ICD-10-CM

## 2025-06-24 DIAGNOSIS — Z13.1 SCREENING FOR DIABETES MELLITUS: ICD-10-CM

## 2025-06-24 DIAGNOSIS — Z11.59 NEED FOR HEPATITIS C SCREENING TEST: ICD-10-CM

## 2025-06-24 DIAGNOSIS — I10 HYPERTENSION, UNSPECIFIED TYPE: ICD-10-CM

## 2025-06-24 DIAGNOSIS — Z72.51 UNPROTECTED SEX: ICD-10-CM

## 2025-06-24 DIAGNOSIS — F51.01 PRIMARY INSOMNIA: ICD-10-CM

## 2025-06-24 DIAGNOSIS — Z12.4 CERVICAL CANCER SCREENING: ICD-10-CM

## 2025-06-24 PROCEDURE — 99214 OFFICE O/P EST MOD 30 MIN: CPT | Mod: GC

## 2025-06-24 PROCEDURE — 3079F DIAST BP 80-89 MM HG: CPT

## 2025-06-24 PROCEDURE — 99214 OFFICE O/P EST MOD 30 MIN: CPT

## 2025-06-24 PROCEDURE — 3074F SYST BP LT 130 MM HG: CPT

## 2025-06-24 PROCEDURE — 3008F BODY MASS INDEX DOCD: CPT

## 2025-06-24 PROCEDURE — 1036F TOBACCO NON-USER: CPT

## 2025-06-24 RX ORDER — FLUOXETINE 10 MG/1
1 TABLET ORAL
COMMUNITY
Start: 2025-05-21

## 2025-06-24 RX ORDER — ACETAMINOPHEN 500 MG
TABLET ORAL EVERY 6 HOURS PRN
COMMUNITY
Start: 2021-09-13

## 2025-06-24 RX ORDER — ACETAMINOPHEN 325 MG/1
TABLET ORAL EVERY 8 HOURS
COMMUNITY
Start: 2022-04-29

## 2025-06-24 RX ORDER — ALBUTEROL SULFATE 90 UG/1
INHALANT RESPIRATORY (INHALATION)
COMMUNITY
Start: 2022-08-24

## 2025-06-24 RX ORDER — IBUPROFEN 600 MG/1
TABLET, FILM COATED ORAL EVERY 6 HOURS
COMMUNITY
Start: 2021-08-06

## 2025-06-24 RX ORDER — CALCIUM CARBONATE 750 MG/1
1 TABLET, CHEWABLE ORAL DAILY
Qty: 1 KIT | Refills: 0 | Status: SHIPPED | OUTPATIENT
Start: 2025-06-24

## 2025-06-24 RX ORDER — MELATONIN 10 MG
10 TABLET, SUBLINGUAL SUBLINGUAL NIGHTLY PRN
Qty: 90 TABLET | Refills: 3 | Status: SHIPPED | OUTPATIENT
Start: 2025-06-24

## 2025-06-24 RX ORDER — ONDANSETRON 4 MG/1
TABLET, FILM COATED ORAL EVERY 6 HOURS
COMMUNITY
Start: 2022-04-29

## 2025-06-24 ASSESSMENT — ENCOUNTER SYMPTOMS
DEPRESSION: 0
LOSS OF SENSATION IN FEET: 0
OCCASIONAL FEELINGS OF UNSTEADINESS: 0

## 2025-06-24 ASSESSMENT — PAIN SCALES - GENERAL: PAINLEVEL_OUTOF10: 0-NO PAIN

## 2025-06-24 NOTE — PROGRESS NOTES
Subjective   Patient ID: Francis Bray is a 29 y.o. female who presents for Annual Exam.  HPI  29 year old female here to establish care with a new PCP. patient doing well with no acute complaints at this time    # partial empty sella syndrome   # headaches   - found on CT on 2025   - pt states that she has no more headaches since going to the ED with headaches and changes in vision ( blurry) She is not taking any medications ( prescribed or OTC for it).   - She does state that her BP was elevated at the time when she checked at home with BP elevated to 180/102 on sitting and 160/95 on laying down.   - IO BP today is 116/80 and pt has had normotensive BP during hospital/ clinic visits per chart review.    - after she experienced the headaches and had ED visit pt stopped smoking cannabis and black and mild. Pt formerly a 2 pack / day ( 10 black and milds) smoker.    - denies weakness, hx of seizures, or syncopal episodes.   - when she had expeienced headaches they would be in the middle of the head and left temporal side. They lasted 20 minutes and went away with ibuprofen and tylenol when she had them.     Previous PCP: since 19 years old.     OBSTETRIC HISTORY:  G/P:      GYNECOLOGICAL HISTORY:  Menarche: 14 yo   LMP: May 27th, regular   Intermenstrual bleeding: denies   Pelvic pain: denies  Breast/Ovarian/Uterine CA: denies   Last PAP: denies   History of Abnormal PAP: no PAP smear in past   Procedures: denies     PAST MEDICAL HISTORY:  Since  get shortness of breath. Gets short of breath walking up a flight of stairs.    PAST SURGICAL HISTORY:  - denies    FAMILY HISTORY:  - mom, maternal grandmother, maternal grandfather- HTN   - mom, maternaln grandmother and maternal grandfather- HTN   -dad: DM, seizures     SOCIAL HISTORY:  Tobacco:  quit 2 months ago. Prior would smoke 2 pack a day ( 10 day). Started at 19 years old/  ETOH: denies   Drug: marijuana stopped 2 months ago   Sexual hx: sexually  active men. One man.   Occupation: home health aid     ALLERGIES:  - denies     RECENT HOSPITALIZATIONS:  - ED in may     RECENT PROCEDURES:  - denies     PAST PREVENTATIVE CARE:  - denies     Review of Systems  12 point ROS negative except as stated in HPI.     Objective   Physical Exam  Gen: NAD, nontox  HEENT: NCAT. MMM.  RESP: no resp distress, talks in full sentences, CTABL  CVS: non-tachycardic, RRR  ABD: Soft, non-distended  Psych: appropriately answers questions. normal mood and affect  MSK: appears to have Full range of motion on all extremities.  : white vaginal discharge around cervix with no other deformities, no cervical motion tenderness, or erythema.     Assessment/Plan   30 yo w/ recent diagnosis of partial empty sella syndrome presents to establish care. She is concerned about her blood pressure as she had gone to the ED in may because of H/A and was found to have partial empty sells syndrome on head CT. The pt has not had headaches since going to the ED on no medications but she does recall that she had high BP at home when she went to the ED. On chart review pt has been normotensive on hospital/ clinic visits and has been normotensive in clinic today. However will order BP monitor and clinical pharmacy referral in order to ensure that pt has good follow up with blood pressure iso partial empty sella syndrome. Pt will also be referred to neurology for follow up with this. Pt also stated that she has had some difficulty sleeping and melatonin has worked in the past. Ordered melatonin 10 mg to be used nightly PRN.     Diagnoses and all orders for this visit:  Partial empty sella syndrome   Found on CT on 5/2025   Pt had headaches at the time which have since resolved spontaneously   - referral to neurology for further workup.     Healthcare maintenance  -     Lipid panel; Future  Need for hepatitis C screening test  -     Hepatitis C antibody; Future  Screening for diabetes mellitus  -      Hemoglobin A1c; Future  Cervical cancer screening  -     THINPREP PAP TEST  Hypertension, unspecified type  Pt endorses elevated BP at home.   - clinical pharmacy referral   -     blood pressure test kit-medium kit; 1 kit early in the morning..  Unprotected sex  -     HCG, quantitative, pregnancy; Future  Primary insomnia  -     melatonin 10 mg tablet, sublingual; Place 10 mg under the tongue as needed at bedtime (for sleep).    RTC in 1 month to follow up on BP and headaches      Seen and discussed with Dr. Dickson,     Deacon Navarro MD, MPH   Family Medicine and Preventive Health, PGY-3      This note was completed using Dragon voice recognition technology and may include unintended errors with respect to translation of words, typographical errors or grammar errors which may not have been identified while finalizing the chart.

## 2025-06-24 NOTE — PROGRESS NOTES
I saw and evaluated the patient. I personally obtained the key and critical portions of the history and physical exam or was physically present for key and critical portions performed by the resident/fellow. I reviewed the resident/fellow's documentation and discussed the patient with the resident/fellow. I agree with the resident/fellow's medical decision making as documented in the note.    Radha Dickson MD

## 2025-06-27 ENCOUNTER — APPOINTMENT (OUTPATIENT)
Dept: OPHTHALMOLOGY | Facility: CLINIC | Age: 30
End: 2025-06-27
Payer: MEDICAID

## 2025-06-30 DIAGNOSIS — F51.01 PRIMARY INSOMNIA: ICD-10-CM

## 2025-07-01 RX ORDER — MELATONIN 10 MG
10 TABLET, SUBLINGUAL SUBLINGUAL NIGHTLY PRN
Qty: 90 TABLET | Refills: 3 | Status: SHIPPED | OUTPATIENT
Start: 2025-07-01

## 2025-07-10 LAB
CYTOLOGY CMNT CVX/VAG CYTO-IMP: NORMAL
LAB AP HPV GENOTYPE QUESTION: NO
LAB AP HPV HR: NORMAL
LABORATORY COMMENT REPORT: NORMAL
LABORATORY COMMENT REPORT: NORMAL
PATH REPORT.TOTAL CANCER: NORMAL

## 2025-09-16 ENCOUNTER — APPOINTMENT (OUTPATIENT)
Dept: OPHTHALMOLOGY | Facility: CLINIC | Age: 30
End: 2025-09-16
Payer: MEDICAID